# Patient Record
Sex: MALE | Race: WHITE
[De-identification: names, ages, dates, MRNs, and addresses within clinical notes are randomized per-mention and may not be internally consistent; named-entity substitution may affect disease eponyms.]

---

## 2018-08-17 ENCOUNTER — HOSPITAL ENCOUNTER (OUTPATIENT)
Dept: HOSPITAL 53 - M RAD | Age: 43
End: 2018-08-17
Payer: COMMERCIAL

## 2018-08-17 DIAGNOSIS — R91.8: Primary | ICD-10-CM

## 2018-08-17 PROCEDURE — 71250 CT THORAX DX C-: CPT

## 2020-05-15 ENCOUNTER — HOSPITAL ENCOUNTER (OUTPATIENT)
Dept: HOSPITAL 53 - M LABSMTC | Age: 45
End: 2020-05-15
Attending: ANESTHESIOLOGY
Payer: COMMERCIAL

## 2020-05-15 DIAGNOSIS — Z11.59: ICD-10-CM

## 2020-05-15 DIAGNOSIS — Z01.818: Primary | ICD-10-CM

## 2020-05-18 ENCOUNTER — HOSPITAL ENCOUNTER (OUTPATIENT)
Dept: HOSPITAL 53 - M OPP | Age: 45
Discharge: HOME | End: 2020-05-18
Attending: INTERNAL MEDICINE
Payer: COMMERCIAL

## 2020-05-18 VITALS — DIASTOLIC BLOOD PRESSURE: 76 MMHG | SYSTOLIC BLOOD PRESSURE: 137 MMHG

## 2020-05-18 VITALS — BODY MASS INDEX: 31.37 KG/M2 | WEIGHT: 207 LBS | HEIGHT: 68 IN

## 2020-05-18 DIAGNOSIS — K64.0: ICD-10-CM

## 2020-05-18 DIAGNOSIS — K21.9: ICD-10-CM

## 2020-05-18 DIAGNOSIS — R12: ICD-10-CM

## 2020-05-18 DIAGNOSIS — K44.9: ICD-10-CM

## 2020-05-18 DIAGNOSIS — Z80.0: ICD-10-CM

## 2020-05-18 DIAGNOSIS — K22.8: ICD-10-CM

## 2020-05-18 DIAGNOSIS — Z12.11: Primary | ICD-10-CM

## 2020-05-18 RX ADMIN — SODIUM CHLORIDE ONE MLS/HR: 9 INJECTION, SOLUTION INTRAVENOUS at 13:20

## 2020-05-18 NOTE — ROOR
________________________________________________________________________________

Patient Name: Mazin Meng           Procedure Date: 5/18/2020 1:54 PM

MRN: [id number]                          Account Number: [id number]

YOB: 1975               Age: 44

Room: [facility]                            Gender: Male

Note Status: Finalized                 

________________________________________________________________________________

 

Procedure:           Total Colonoscopy to Cecum + ileoscopy

Indications:         Screening in patient at increased risk: Family history of 

                     1st-degree relative with colorectal cancer, Last 

                     colonoscopy: 2014

Providers:           Bandar Johnson MD

Referring MD:        FRANCES AKHTAR MD

Requesting Provider: 

Medicines:           Monitored Anesthesia Care

Complications:       No immediate complications.

________________________________________________________________________________

Procedure:           Pre-Anesthesia Assessment:

                     - The heart rate, respiratory rate, oxygen saturations, 

                     blood pressure, adequacy of pulmonary ventilation, and 

                     response to care were monitored throughout the procedure.

                     The Colonoscope was introduced through the anus and 

                     advanced to the terminal ileum, with identification of 

                     the appendiceal orifice and IC valve. The colonoscopy was 

                     performed without difficulty. The patient tolerated the 

                     procedure well. The quality of the bowel preparation was 

                     excellent.

                                                                                

Findings:

     The perianal and digital rectal examinations were normal.

     Non-bleeding internal hemorrhoids were found during retroflexion. The 

     hemorrhoids were small and Grade I (internal hemorrhoids that do not 

     prolapse).

     The terminal ileum appeared normal.

     The exam was otherwise without abnormality on direct and retroflexion 

     views.

                                                                                

Impression:          - Non-bleeding internal hemorrhoids.

                     - The examined portion of the ileum was normal.

                     - The examination was otherwise normal on direct and 

                     retroflexion views.

                     - No specimens collected.

                     - The exam was otherwise normal to the cecum.

Recommendation:      - Patient has a contact number available for emergencies. 

                     The signs and symptoms of potential delayed complications 

                     were discussed with the patient. Return to normal 

                     activities tomorrow. Written discharge instructions were 

                     provided to the patient.

                     - High fiber diet.

                     - Discharge patient to home.

                     - Continue present medications.

                     - Repeat colonoscopy in 5 years for screening purposes.

                     - Return to referring physician.

                     - The findings and recommendations were discussed with 

                     the patient's family.

                                                                                

 

Bandar Johnson MD

____________________

Bandar Johnson MD

5/18/2020 2:21:16 PM

Electronically signed by Bandar Johnson MD

Number of Addenda: 0

 

Note Initiated On: 5/18/2020 1:54 PM

Estimated Blood Loss:

     Estimated blood loss: none.

## 2020-05-18 NOTE — ROOR
________________________________________________________________________________

Patient Name: Mazin Meng           Procedure Date: 5/18/2020 1:53 PM

MRN: [id number]                          Account Number: [id number]

YOB: 1975               Age: 44

Room: [facility]                            Gender: Male

Note Status: Finalized                 

________________________________________________________________________________

 

Procedure:           Upper Endoscopy + Biopsies

Indications:         Heartburn, Exclusion of Rvias's esophagus

Providers:           Bandar Johnson MD

Referring MD:        FRANCES AKHTAR MD

Requesting Provider: 

Medicines:           Monitored Anesthesia Care

Complications:       No immediate complications.

________________________________________________________________________________

Procedure:           Pre-Anesthesia Assessment:

                     - The heart rate, respiratory rate, oxygen saturations, 

                     blood pressure, adequacy of pulmonary ventilation, and 

                     response to care were monitored throughout the procedure.

                     The Endoscope was introduced through the mouth, and 

                     advanced to the second part of duodenum. The upper GI 

                     endoscopy was accomplished without difficulty. The 

                     patient tolerated the procedure well.

                                                                                

Findings:

     The Z-line was variable and was found 35 cm from the incisors. Multiple 

     biopsies were obtained with cold forceps for evaluation to rule out 

     Rivas's Esophagus randomly at the gastroesophageal junction.

     A medium-sized hiatal hernia was present.

     No other significant abnormalities were identified in a careful 

     examination of the stomach.

     The exam of the duodenum was otherwise normal.

                                                                                

Impression:          - Z-line variable, 35 cm from the incisors.

                     - Medium-sized hiatal hernia.

                     - Multiple biopsies were obtained at the gastroesophageal 

                     junction.

                     - The examination was otherwise normal.

Recommendation:      - Patient has a contact number available for emergencies. 

                     The signs and symptoms of potential delayed complications 

                     were discussed with the patient. Return to normal 

                     activities tomorrow. Written discharge instructions were 

                     provided to the patient.

                     - High fiber diet.

                     - Discharge patient to home.

                     - Follow an antireflux regimen.

                     - Continue present medications.

                     - Await pathology results.

                     - Telephone GI clinic for pathology results in 1 week.

                     - Return to referring physician.

                     - The findings and recommendations were discussed with 

                     the patient's family.

                                                                                

 

Bandar Johnson MD

____________________

Bandar Johnson MD

5/18/2020 2:07:46 PM

Electronically signed by Bandar Johnson MD

Number of Addenda: 0

 

Note Initiated On: 5/18/2020 1:53 PM

Estimated Blood Loss:

     Estimated blood loss: none.